# Patient Record
(demographics unavailable — no encounter records)

---

## 2025-02-06 NOTE — PHYSICAL EXAM
[Alert] : alert [No Acute Distress] : in no acute distress [Sclera] : the sclera and conjunctiva were normal [Normal Outer Ear/Nose] : the ears and nose were normal in appearance [Normal Appearance] : the appearance of the neck was normal [Supple] : the neck was supple [No Respiratory Distress] : no respiratory distress [No Acc Muscle Use] : no accessory muscle use [Respiration, Rhythm And Depth] : normal respiratory rhythm and effort [Auscultation Breath Sounds / Voice Sounds] : lungs were clear to auscultation bilaterally [Heart Rate And Rhythm] : heart rate was normal and rhythm regular [Edema] : edema was not present [Abdomen Tenderness] : non-tender [Abdomen Soft] : soft [No Spinal Tenderness] : no spinal tenderness [No Clubbing, Cyanosis] : no clubbing or cyanosis of the fingernails [Involuntary Movements] : no involuntary movements were seen [] : no rash [Skin Lesions] : no skin lesions [No Focal Deficits] : no focal deficits [Oriented To Time, Place, And Person] : oriented to person, place, and time

## 2025-02-12 NOTE — HISTORY OF PRESENT ILLNESS
[FreeTextEntry1] : Patient seen and examined at home.  Patient is homebound 2/2 generalized weakness/debility and advanced age.  94F w/PMHx of HTN, TIA seen for initial HVP evaluation.  Son in law and HHA present during visit.  Patient reports at baseline.  No acute complaints.  No CP/SOB. No abdominal complaints with good appetite and regular BM's.  No urinary complaints.  No recent falls.  No skin wounds.  Patient lives at home with  and Dtr lives upstairs.  Other family lives close by and is involved.  Has HHA 9h/day when split with her .    ADL Assessment: - Patient does not need assistance to ambulate/transfer - Patient does not need assistance to perform toileting - Patient needs assistance to perform personal hygiene/grooming - Patient needs assistance to cook - Patient does not need assistance to eat - Patient needs assistance to dress

## 2025-02-12 NOTE — ASSESSMENT
[FreeTextEntry1] : #HTN - BP stable - c/w current Rx - DASH Diet  #Hx of TIA - c/w Plavix, not on statin - not following with Neuro  #Macular Degeneration - has severely limited eye sight - not on active Rx - no longer following with Optho  #Generalized Weakness #Gait Instability - DME for ambulation - pain control - fall precautions - refused PT/OT

## 2025-06-09 NOTE — HISTORY OF PRESENT ILLNESS
[FreeTextEntry1] : Patient seen and examined at home.  Patient is homebound 2/2 generalized weakness/debility and advanced age.  94F w/PMHx of HTN, TIA seen for initial HVP evaluation.  Son in law and HHA present during visit.

## 2025-06-09 NOTE — PHYSICAL EXAM
[Alert] : alert [Sclera] : the sclera and conjunctiva were normal [Normal Outer Ear/Nose] : the ears and nose were normal in appearance [Normal Appearance] : the appearance of the neck was normal [Supple] : the neck was supple [No Respiratory Distress] : no respiratory distress [No Acc Muscle Use] : no accessory muscle use [Respiration, Rhythm And Depth] : normal respiratory rhythm and effort [Auscultation Breath Sounds / Voice Sounds] : lungs were clear to auscultation bilaterally [Heart Rate And Rhythm] : heart rate was normal and rhythm regular [Edema] : edema was not present [Abdomen Tenderness] : non-tender [Abdomen Soft] : soft [No Spinal Tenderness] : no spinal tenderness [Involuntary Movements] : no involuntary movements were seen [No Clubbing, Cyanosis] : no clubbing or cyanosis of the fingernails [] : no rash [Skin Lesions] : no skin lesions [No Focal Deficits] : no focal deficits [Oriented To Time, Place, And Person] : oriented to person, place, and time